# Patient Record
Sex: FEMALE | URBAN - METROPOLITAN AREA
[De-identification: names, ages, dates, MRNs, and addresses within clinical notes are randomized per-mention and may not be internally consistent; named-entity substitution may affect disease eponyms.]

---

## 2018-07-23 NOTE — PATIENT DISCUSSION
The visual field is suspicious for glaucomatous damage but does not match [facility].

## 2019-02-14 NOTE — PATIENT DISCUSSION
The visual field is suspicious for glaucomatous damage but does not match [facility].

## 2021-10-22 ENCOUNTER — PREPPED CHART (OUTPATIENT)
Dept: URBAN - METROPOLITAN AREA CLINIC 20 | Facility: CLINIC | Age: 51
End: 2021-10-22

## 2021-12-27 NOTE — PATIENT DISCUSSION
Newton Cooley 73 OCT is stable ou.   Photos taken today ou. Spoke with Hayley Parisi about requested refill for Oxybutynin ER 5 mg po daily. Pt stated she is looking to get this refilled . Informed patient that she has not been seen in office in the past year and to refill this she needs to be seen.   Pt requesting to sc

## 2022-01-24 NOTE — PATIENT DISCUSSION
This visual field clearly demonstrated a minimum of 41% loss of upper field of vision OU, with upper lid skin in repose and elevated by taping of the lid to demonstrate potential correction. This field shows that taping the lids significantly improved this patient's superior field of vision by approximately 32%, OU.

## 2023-03-27 NOTE — PATIENT DISCUSSION
Continue Betimol 0.5% OU QAM. Detail Level: Detailed Depth Of Biopsy: dermis Was A Bandage Applied: Yes Size Of Lesion In Cm: 0.9 X Size Of Lesion In Cm: 0 Biopsy Type: H and E Biopsy Method: Dermablade Anesthesia Type: 1% lidocaine with epinephrine Anesthesia Volume In Cc (Will Not Render If 0): 0.5 Hemostasis: Drysol Wound Care: Petrolatum Dressing: Band-Aid Destruction After The Procedure: No Type Of Destruction Used: Curettage Curettage Text: The wound bed was treated with curettage after the biopsy was performed. Cryotherapy Text: The wound bed was treated with cryotherapy after the biopsy was performed. Electrodesiccation Text: The wound bed was treated with electrodesiccation after the biopsy was performed. Electrodesiccation And Curettage Text: The wound bed was treated with electrodesiccation and curettage after the biopsy was performed. Silver Nitrate Text: The wound bed was treated with silver nitrate after the biopsy was performed. Lab: [id number] Consent: Verbal consent was obtained and risks were reviewed including but not limited to scarring, infection, bleeding, scabbing, incomplete removal, nerve damage and allergy to anesthesia. Post-Care Instructions: I reviewed with the patient in detail post-care instructions. Patient is to keep the biopsy site dry overnight, and then apply bacitracin twice daily until healed. Patient may apply hydrogen peroxide soaks to remove any crusting. Notification Instructions: Patient will be notified of biopsy results. However, patient instructed to call the office if not contacted within 2 weeks. Billing Type: Third-Party Bill Information: Selecting Yes will display possible errors in your note based on the variables you have selected. This validation is only offered as a suggestion for you. PLEASE NOTE THAT THE VALIDATION TEXT WILL BE REMOVED WHEN YOU FINALIZE YOUR NOTE. IF YOU WANT TO FAX A PRELIMINARY NOTE YOU WILL NEED TO TOGGLE THIS TO 'NO' IF YOU DO NOT WANT IT IN YOUR FAXED NOTE. Body Location Override (Optional - Billing Will Still Be Based On Selected Body Map Location If Applicable): left superior medial shin Size Of Lesion In Cm: 0.6 X Size Of Lesion In Cm: 0.4 Consent: Written consent was obtained and risks were reviewed including but not limited to scarring, infection, bleeding, scabbing, incomplete removal, nerve damage and allergy to anesthesia. Billing Type: Third-Party Bill Body Location Override (Optional - Billing Will Still Be Based On Selected Body Map Location If Applicable): left proximal pretibial region

## 2023-06-29 NOTE — PATIENT DISCUSSION
Patient is doing well following YAG capsulotomy. Signs and symptoms of retinal detachment including flashing and floaters were discussed. Patient was asked to schedule yearly preventative follow-up eye exams with us. Ruptured

## 2023-07-18 ENCOUNTER — ESTABLISHED PATIENT (OUTPATIENT)
Dept: URBAN - METROPOLITAN AREA CLINIC 21 | Facility: CLINIC | Age: 53
End: 2023-07-18

## 2023-07-18 DIAGNOSIS — H18.613: ICD-10-CM

## 2023-07-18 PROCEDURE — 92002 INTRM OPH EXAM NEW PATIENT: CPT

## 2023-07-18 ASSESSMENT — VISUAL ACUITY
OU_CC: 20/30-1
OS_CC: 20/40-1
OD_CC: 20/40

## 2023-07-18 ASSESSMENT — TONOMETRY
OS_IOP_MMHG: 10
OD_IOP_MMHG: 13

## 2023-08-14 ENCOUNTER — CONSULTATION/EVALUATION (OUTPATIENT)
Dept: URBAN - METROPOLITAN AREA CLINIC 20 | Facility: CLINIC | Age: 53
End: 2023-08-14

## 2023-08-14 DIAGNOSIS — H35.363: ICD-10-CM

## 2023-08-14 DIAGNOSIS — H25.813: ICD-10-CM

## 2023-08-14 PROCEDURE — 92014 COMPRE OPH EXAM EST PT 1/>: CPT

## 2023-08-14 PROCEDURE — 92134 CPTRZ OPH DX IMG PST SGM RTA: CPT

## 2023-08-14 PROCEDURE — 92136 OPHTHALMIC BIOMETRY: CPT

## 2023-08-14 ASSESSMENT — KERATOMETRY
OS_K2POWER_DIOPTERS: 50.25
OS_K1POWER_DIOPTERS: 42.00
OS_AXISANGLE2_DEGREES: 20
OD_AXISANGLE2_DEGREES: 170
OD_AXISANGLE_DEGREES: 080
OS_AXISANGLE_DEGREES: 110
OD_K1POWER_DIOPTERS: 44.25
OD_K2POWER_DIOPTERS: 47.50

## 2023-08-14 ASSESSMENT — VISUAL ACUITY
OD_SC: 20/25
OD_CC: 20/100
OS_SC: 20/40-1
OS_CC: 20/200

## 2023-08-14 ASSESSMENT — TONOMETRY
OS_IOP_MMHG: 9
OD_IOP_MMHG: 10